# Patient Record
Sex: MALE | Race: WHITE | NOT HISPANIC OR LATINO
[De-identification: names, ages, dates, MRNs, and addresses within clinical notes are randomized per-mention and may not be internally consistent; named-entity substitution may affect disease eponyms.]

---

## 2017-07-05 ENCOUNTER — APPOINTMENT (OUTPATIENT)
Dept: FAMILY MEDICINE | Facility: CLINIC | Age: 32
End: 2017-07-05

## 2017-07-05 VITALS
DIASTOLIC BLOOD PRESSURE: 74 MMHG | RESPIRATION RATE: 12 BRPM | HEART RATE: 88 BPM | OXYGEN SATURATION: 98 % | TEMPERATURE: 98.3 F | SYSTOLIC BLOOD PRESSURE: 122 MMHG | BODY MASS INDEX: 31.56 KG/M2 | HEIGHT: 72 IN | WEIGHT: 233 LBS

## 2017-07-05 DIAGNOSIS — Z01.84 ENCOUNTER FOR ANTIBODY RESPONSE EXAMINATION: ICD-10-CM

## 2017-07-06 LAB
HBV SURFACE AB SER QL: REACTIVE
MEV IGG FLD QL IA: 99.8 AU/ML
MEV IGG+IGM SER-IMP: POSITIVE
MUV AB SER-ACNC: NEGATIVE
MUV IGG SER QL IA: <5 AU/ML
RUBV IGG FLD-ACNC: 4.1 INDEX
RUBV IGG SER-IMP: POSITIVE
VZV AB TITR SER: POSITIVE
VZV IGG SER IF-ACNC: 2013 INDEX

## 2017-08-31 ENCOUNTER — EMERGENCY (EMERGENCY)
Facility: HOSPITAL | Age: 32
LOS: 1 days | Discharge: HOME | End: 2017-08-31
Attending: EMERGENCY MEDICINE | Admitting: EMERGENCY MEDICINE
Payer: COMMERCIAL

## 2017-08-31 VITALS
HEIGHT: 73 IN | SYSTOLIC BLOOD PRESSURE: 152 MMHG | OXYGEN SATURATION: 98 % | WEIGHT: 235.01 LBS | RESPIRATION RATE: 18 BRPM | TEMPERATURE: 98 F | DIASTOLIC BLOOD PRESSURE: 83 MMHG | HEART RATE: 98 BPM

## 2017-08-31 PROCEDURE — 99284 EMERGENCY DEPT VISIT MOD MDM: CPT

## 2017-08-31 PROCEDURE — 99283 EMERGENCY DEPT VISIT LOW MDM: CPT

## 2017-08-31 RX ORDER — CEPHALEXIN 500 MG
1 CAPSULE ORAL
Qty: 30 | Refills: 0 | OUTPATIENT
Start: 2017-08-31 | End: 2017-09-10

## 2017-08-31 RX ORDER — CEPHALEXIN 500 MG
500 CAPSULE ORAL ONCE
Qty: 0 | Refills: 0 | Status: COMPLETED | OUTPATIENT
Start: 2017-08-31 | End: 2017-08-31

## 2017-08-31 RX ADMIN — Medication 500 MILLIGRAM(S): at 16:24

## 2017-08-31 NOTE — ED STATDOCS - OBJECTIVE STATEMENT
33 y/o M presents to ED c/o L great toe pain x 3 days. Pt states he was cutting and picking at his nail and ever since the redness onset. Had put band aids on his toe. Admits to seeing yellow discharge. Denies radiation, N/V/D, fever, chills, SOB, CP, difficulty breathing, HA, numbness, tingling and abd pain.

## 2017-08-31 NOTE — ED STATDOCS - MEDICAL DECISION MAKING DETAILS
Advised patient to stay to have labs, IV antibiotics and blood work done. Pt declined this and stated he would rather do PO antibiotics and be discharged. Will prescribe antibiotics and DC. Pt. with early cellulitis of great toe. Advised patient to stay to have labs, IV antibiotics and blood work done. Pt declined this and stated he would rather do PO antibiotics and be discharged. Will prescribe antibiotics and DC. Pt. given podiatry consultation.

## 2017-08-31 NOTE — ED STATDOCS - SKIN, MLM
L great toe erythematous. Malodorous. Ingrown toe nail noted. No active discharge. L great toe non-tender to palpitation.

## 2017-10-18 ENCOUNTER — APPOINTMENT (OUTPATIENT)
Dept: FAMILY MEDICINE | Facility: CLINIC | Age: 32
End: 2017-10-18
Payer: COMMERCIAL

## 2017-10-18 PROCEDURE — 00006S TB TEST: CUSTOM

## 2017-12-20 ENCOUNTER — APPOINTMENT (OUTPATIENT)
Dept: FAMILY MEDICINE | Facility: CLINIC | Age: 32
End: 2017-12-20
Payer: COMMERCIAL

## 2017-12-20 VITALS
RESPIRATION RATE: 12 BRPM | DIASTOLIC BLOOD PRESSURE: 72 MMHG | TEMPERATURE: 98.5 F | OXYGEN SATURATION: 97 % | HEIGHT: 72 IN | WEIGHT: 243 LBS | SYSTOLIC BLOOD PRESSURE: 124 MMHG | BODY MASS INDEX: 32.91 KG/M2 | HEART RATE: 70 BPM

## 2017-12-20 DIAGNOSIS — Z87.09 PERSONAL HISTORY OF OTHER DISEASES OF THE RESPIRATORY SYSTEM: ICD-10-CM

## 2017-12-20 DIAGNOSIS — Z78.9 OTHER SPECIFIED HEALTH STATUS: ICD-10-CM

## 2017-12-20 DIAGNOSIS — R50.9 FEVER, UNSPECIFIED: ICD-10-CM

## 2017-12-20 PROCEDURE — 99213 OFFICE O/P EST LOW 20 MIN: CPT

## 2017-12-20 RX ORDER — CEPHALEXIN 500 MG/1
500 CAPSULE ORAL
Qty: 30 | Refills: 0 | Status: COMPLETED | COMMUNITY
Start: 2017-08-31

## 2018-09-26 ENCOUNTER — APPOINTMENT (OUTPATIENT)
Dept: FAMILY MEDICINE | Facility: CLINIC | Age: 33
End: 2018-09-26
Payer: COMMERCIAL

## 2018-09-26 VITALS
OXYGEN SATURATION: 98 % | WEIGHT: 240 LBS | TEMPERATURE: 98.2 F | SYSTOLIC BLOOD PRESSURE: 128 MMHG | BODY MASS INDEX: 32.51 KG/M2 | DIASTOLIC BLOOD PRESSURE: 82 MMHG | RESPIRATION RATE: 10 BRPM | HEART RATE: 77 BPM | HEIGHT: 72 IN

## 2018-09-26 DIAGNOSIS — Z11.1 ENCOUNTER FOR SCREENING FOR RESPIRATORY TUBERCULOSIS: ICD-10-CM

## 2018-09-26 DIAGNOSIS — R05 COUGH: ICD-10-CM

## 2018-09-26 DIAGNOSIS — R53.83 OTHER FATIGUE: ICD-10-CM

## 2018-09-26 DIAGNOSIS — L03.90 CELLULITIS, UNSPECIFIED: ICD-10-CM

## 2018-09-26 DIAGNOSIS — Z78.9 OTHER SPECIFIED HEALTH STATUS: ICD-10-CM

## 2018-09-26 PROCEDURE — 99214 OFFICE O/P EST MOD 30 MIN: CPT

## 2018-09-26 RX ORDER — AMOXICILLIN AND CLAVULANATE POTASSIUM 875; 125 MG/1; MG/1
875-125 TABLET, COATED ORAL
Qty: 20 | Refills: 0 | Status: ACTIVE | COMMUNITY
Start: 2018-09-26 | End: 1900-01-01

## 2018-09-26 NOTE — PHYSICAL EXAM
[No Acute Distress] : no acute distress [Well Nourished] : well nourished [Normal Sclera/Conjunctiva] : normal sclera/conjunctiva [Normal Outer Ear/Nose] : the outer ears and nose were normal in appearance [Normal Oropharynx] : the oropharynx was normal [Supple] : supple [No Lymphadenopathy] : no lymphadenopathy [No Respiratory Distress] : no respiratory distress  [Clear to Auscultation] : lungs were clear to auscultation bilaterally [Normal Rate] : normal rate  [Regular Rhythm] : with a regular rhythm [Normal Posterior Cervical Nodes] : no posterior cervical lymphadenopathy [Normal Anterior Cervical Nodes] : no anterior cervical lymphadenopathy [Grossly Normal Strength/Tone] : grossly normal strength/tone [Normal Gait] : normal gait [Coordination Grossly Intact] : coordination grossly intact [No Focal Deficits] : no focal deficits [Normal Affect] : the affect was normal [Normal Insight/Judgement] : insight and judgment were intact

## 2018-09-26 NOTE — REVIEW OF SYSTEMS
[Depression] : depression [Negative] : Heme/Lymph [Suicidal] : not suicidal [Insomnia] : no insomnia

## 2018-09-26 NOTE — HISTORY OF PRESENT ILLNESS
[FreeTextEntry8] : Patient presents with C/O left hand/ 3rd digit medial blister he tried to remove on his own.. Had Tdap 3 years ago. \par Also, has been trying to conceive a child with his wife for last year . Started intermittently and now consistent last 3 months. States his wife was cleared and now he needs a referral for evaluation.\par Last full Physical 8/18 through [facility].

## 2018-09-26 NOTE — COUNSELING
[Weight management counseling provided] : Weight management [Healthy eating counseling provided] : healthy eating [Activity counseling provided] : activity [Behavioral health counseling provided] : behavioral health  [Low Fat Diet] : Low fat diet [Low Salt Diet] : Low salt diet [Walking] : Walking [None] : None

## 2018-09-26 NOTE — HEALTH RISK ASSESSMENT
[No falls in past year] : Patient reported no falls in the past year [] : No [de-identified] : social

## 2018-09-26 NOTE — PLAN
[FreeTextEntry1] : Referral to Psychiatrist was provided. Advised to follow up with Urologist for further evaluation.\par Will begin antibiotic therapy as advised.

## 2018-09-27 LAB
ALBUMIN SERPL ELPH-MCNC: 4.6 G/DL
ALP BLD-CCNC: 79 U/L
ALT SERPL-CCNC: 31 U/L
ANION GAP SERPL CALC-SCNC: 19 MMOL/L
AST SERPL-CCNC: 16 U/L
BASOPHILS # BLD AUTO: 0.03 K/UL
BASOPHILS NFR BLD AUTO: 0.3 %
BILIRUB SERPL-MCNC: 0.7 MG/DL
BUN SERPL-MCNC: 12 MG/DL
CALCIUM SERPL-MCNC: 9.6 MG/DL
CHLORIDE SERPL-SCNC: 103 MMOL/L
CO2 SERPL-SCNC: 20 MMOL/L
CREAT SERPL-MCNC: 0.94 MG/DL
EOSINOPHIL # BLD AUTO: 0.09 K/UL
EOSINOPHIL NFR BLD AUTO: 1 %
GLUCOSE SERPL-MCNC: 91 MG/DL
HCT VFR BLD CALC: 47.3 %
HGB BLD-MCNC: 15.5 G/DL
IMM GRANULOCYTES NFR BLD AUTO: 0.2 %
LYMPHOCYTES # BLD AUTO: 2.46 K/UL
LYMPHOCYTES NFR BLD AUTO: 28.6 %
MAN DIFF?: NORMAL
MCHC RBC-ENTMCNC: 28.6 PG
MCHC RBC-ENTMCNC: 32.8 GM/DL
MCV RBC AUTO: 87.3 FL
MONOCYTES # BLD AUTO: 0.72 K/UL
MONOCYTES NFR BLD AUTO: 8.4 %
NEUTROPHILS # BLD AUTO: 5.29 K/UL
NEUTROPHILS NFR BLD AUTO: 61.5 %
PLATELET # BLD AUTO: 281 K/UL
POTASSIUM SERPL-SCNC: 4.4 MMOL/L
PROT SERPL-MCNC: 7.2 G/DL
RBC # BLD: 5.42 M/UL
RBC # FLD: 14.7 %
SODIUM SERPL-SCNC: 142 MMOL/L
TSH SERPL-ACNC: 2.42 UIU/ML
WBC # FLD AUTO: 8.61 K/UL

## 2018-10-03 ENCOUNTER — APPOINTMENT (OUTPATIENT)
Dept: UROLOGY | Facility: CLINIC | Age: 33
End: 2018-10-03
Payer: COMMERCIAL

## 2018-10-03 VITALS
HEART RATE: 80 BPM | TEMPERATURE: 97.9 F | BODY MASS INDEX: 32.51 KG/M2 | OXYGEN SATURATION: 99 % | DIASTOLIC BLOOD PRESSURE: 80 MMHG | SYSTOLIC BLOOD PRESSURE: 121 MMHG | WEIGHT: 240 LBS | HEIGHT: 72 IN

## 2018-10-03 DIAGNOSIS — N46.9 MALE INFERTILITY, UNSPECIFIED: ICD-10-CM

## 2018-10-03 LAB
TESTOST BND SERPL-MCNC: 14.4 PG/ML
TESTOST SERPL-MCNC: 324.7 NG/DL

## 2018-10-03 PROCEDURE — 99204 OFFICE O/P NEW MOD 45 MIN: CPT

## 2018-10-10 LAB
APPEARANCE: CLEAR
BILIRUBIN URINE: NEGATIVE
BLOOD URINE: NEGATIVE
COLOR: YELLOW
GLUCOSE QUALITATIVE U: NEGATIVE MG/DL
KETONES URINE: NEGATIVE
LEUKOCYTE ESTERASE URINE: NEGATIVE
NITRITE URINE: NEGATIVE
PH URINE: 7
PROTEIN URINE: NEGATIVE MG/DL
SPECIFIC GRAVITY URINE: 1.03
UROBILINOGEN URINE: NEGATIVE MG/DL

## 2019-02-06 ENCOUNTER — APPOINTMENT (OUTPATIENT)
Dept: UROLOGY | Facility: CLINIC | Age: 34
End: 2019-02-06

## 2019-02-27 ENCOUNTER — APPOINTMENT (OUTPATIENT)
Dept: ORTHOPEDIC SURGERY | Facility: CLINIC | Age: 34
End: 2019-02-27

## 2019-03-06 ENCOUNTER — APPOINTMENT (OUTPATIENT)
Dept: ORTHOPEDIC SURGERY | Facility: CLINIC | Age: 34
End: 2019-03-06
Payer: COMMERCIAL

## 2019-03-06 VITALS
HEIGHT: 72 IN | BODY MASS INDEX: 33.46 KG/M2 | WEIGHT: 247 LBS | DIASTOLIC BLOOD PRESSURE: 80 MMHG | SYSTOLIC BLOOD PRESSURE: 120 MMHG | HEART RATE: 80 BPM

## 2019-03-06 DIAGNOSIS — Q66.7 CONGENITAL PES CAVUS: ICD-10-CM

## 2019-03-06 DIAGNOSIS — M25.571 PAIN IN RIGHT ANKLE AND JOINTS OF RIGHT FOOT: ICD-10-CM

## 2019-03-06 PROCEDURE — 73610 X-RAY EXAM OF ANKLE: CPT | Mod: RT

## 2019-03-06 PROCEDURE — 99204 OFFICE O/P NEW MOD 45 MIN: CPT

## 2019-03-06 NOTE — PHYSICAL EXAM
[de-identified] : General: Alert and oriented x3. In no acute distress. Pleasant in nature with a normal affect. No apparent respiratory distress.\par \par BL Standing Exam\par Hindfoot varus, cavus foot, lateral overload, significant loss of tone to the leg. \par \par R Ankle Exam\par Skin: Clean, dry and intact.\par Inspection: No obvious malalignment, no swelling, no effusion;no lymphadenopathy. Varus alignment. Medial incision that is healed. Hyperextension of the 2nd toe.\par Pulses: 2+ DP/PT pulses\par ROM: Right: neutral  degrees dorsiflexion, 20   degrees of plantarflexion,  10  degrees of subtalar motion. Left: 10 degrees dorsiflexion, 40   degrees of plantarflexion,  10  degrees of subtalar motion.\par Tenderness:  No pain to the foot. No tenderness over the lateral malleolus, no CFL/ATFL/PTFL pain. No medial malleolus pain, no deltoid ligament pain. No proximal fibular pain. No heel pain.\par Stability: Negative anterior/posterior drawer. \par Strength: 5/5 TA/GS/EHL\par Neuro: Intact to light touch throughout\par Additional tests: Negative England's test, negative syndesmosis squeeze test.\par  [de-identified] : 3V of R foot were ordered obtained and reviewed by me today revealed cavus foot, no acute fx. \par
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
## 2019-03-06 NOTE — DISCUSSION/SUMMARY
[de-identified] : Today in the office I had a lengthy discussion with the patient regarding his R ankle pain. I have addressed all of the patient's concern surrounding the pathology of their conditions. I gave the pt the following tx options that includes: offloading orthotics, brace, and sx as a last option. We have agreed to move forward with the conservative treatment at this time. Therefore, he will utilize custom orthotics for the interim. Pt is aware his deformity may progress. The pt is to call me as soon as possible if they notice any worsening pain or symptoms. I would like to follow up with the patient within the next 6-8 weeks. All questions were answered and the patient verbalized understanding. The patient is in agreement with this treatment plan.\par \par EMT\par Cavus s/p tendon transfer in 94\par May need MRI in future\par Lateral wedge/posting orthotic with medial drop out\par Possible AFO in future\par POssible releases vs. cavus recon in future.

## 2019-03-06 NOTE — ADDENDUM
[FreeTextEntry1] : Documented by Linda White acting as a scribe for Dr. Roa on 03/06/2019 \par \par All medical record entries made by the Scribe were at my, Dr. Freitas, direction and\par personally dictated by me on 03/06/2019 . I have reviewed the chart and agree that the record\par accurately reflects my personal performance of the history, physical exam, procedure and imaging.

## 2019-03-06 NOTE — CONSULT LETTER
[Consult Letter:] : I had the pleasure of evaluating your patient, [unfilled]. [Consult Closing:] : Thank you very much for allowing me to participate in the care of this patient.  If you have any questions, please do not hesitate to contact me. [Sincerely,] : Sincerely, [FreeTextEntry2] : CHANDA JACOB  [FreeTextEntry3] : Wni Roa

## 2019-04-19 ENCOUNTER — APPOINTMENT (OUTPATIENT)
Dept: ORTHOPEDIC SURGERY | Facility: CLINIC | Age: 34
End: 2019-04-19

## 2020-12-15 PROBLEM — Z87.09 HISTORY OF ACUTE SINUSITIS: Status: RESOLVED | Noted: 2017-12-20 | Resolved: 2020-12-15

## 2024-10-10 NOTE — ED STATDOCS - CARE PLAN
[facility] EMERGENCY DEPARTMENT  [address]  [facility] [address]  Phone: [phone]    Tyrone Carvajal was seen and treated in our emergency department on 10/10/2024.  He may return to work on 10/12/2024.         Thank you for choosing [facility].    Boby Tirado MD      
Principal Discharge DX:	Cellulitis